# Patient Record
Sex: FEMALE | Race: WHITE | NOT HISPANIC OR LATINO | URBAN - METROPOLITAN AREA
[De-identification: names, ages, dates, MRNs, and addresses within clinical notes are randomized per-mention and may not be internally consistent; named-entity substitution may affect disease eponyms.]

---

## 2017-06-06 ENCOUNTER — ALLSCRIPTS OFFICE VISIT (OUTPATIENT)
Dept: OTHER | Facility: OTHER | Age: 37
End: 2017-06-06

## 2017-06-06 PROCEDURE — 87591 N.GONORRHOEAE DNA AMP PROB: CPT | Performed by: OBSTETRICS & GYNECOLOGY

## 2017-06-06 PROCEDURE — G0145 SCR C/V CYTO,THINLAYER,RESCR: HCPCS | Performed by: OBSTETRICS & GYNECOLOGY

## 2017-06-06 PROCEDURE — 87624 HPV HI-RISK TYP POOLED RSLT: CPT | Performed by: OBSTETRICS & GYNECOLOGY

## 2017-06-06 PROCEDURE — 87491 CHLMYD TRACH DNA AMP PROBE: CPT | Performed by: OBSTETRICS & GYNECOLOGY

## 2017-06-07 ENCOUNTER — LAB REQUISITION (OUTPATIENT)
Dept: LAB | Facility: HOSPITAL | Age: 37
End: 2017-06-07
Payer: COMMERCIAL

## 2017-06-07 DIAGNOSIS — Z12.4 ENCOUNTER FOR SCREENING FOR MALIGNANT NEOPLASM OF CERVIX: ICD-10-CM

## 2017-06-07 DIAGNOSIS — Z11.51 ENCOUNTER FOR SCREENING FOR HUMAN PAPILLOMAVIRUS (HPV): ICD-10-CM

## 2017-06-07 DIAGNOSIS — Z11.3 ENCOUNTER FOR SCREENING FOR INFECTIONS WITH PREDOMINANTLY SEXUAL MODE OF TRANSMISSION: ICD-10-CM

## 2017-06-16 LAB
CHLAMYDIA DNA CVX QL NAA+PROBE: NORMAL
HPV RRNA GENITAL QL NAA+PROBE: ABNORMAL
N GONORRHOEA DNA GENITAL QL NAA+PROBE: NORMAL

## 2017-06-20 ENCOUNTER — GENERIC CONVERSION - ENCOUNTER (OUTPATIENT)
Dept: OTHER | Facility: OTHER | Age: 37
End: 2017-06-20

## 2017-06-20 LAB
LAB AP GYN PRIMARY INTERPRETATION: NORMAL
LAB AP LMP: NORMAL
Lab: NORMAL
PATH INTERP SPEC-IMP: NORMAL

## 2017-06-22 ENCOUNTER — GENERIC CONVERSION - ENCOUNTER (OUTPATIENT)
Dept: OTHER | Facility: OTHER | Age: 37
End: 2017-06-22

## 2017-11-02 ENCOUNTER — ALLSCRIPTS OFFICE VISIT (OUTPATIENT)
Dept: OTHER | Facility: OTHER | Age: 37
End: 2017-11-02

## 2017-11-02 PROCEDURE — 88305 TISSUE EXAM BY PATHOLOGIST: CPT | Performed by: OBSTETRICS & GYNECOLOGY

## 2017-11-03 NOTE — PROCEDURES
Assessment  1  ASCUS with positive high risk HPV cervical (795 01,795 05) (R87 610,R87 810)    Discussion/Summary  Discussion Summary:   ASCUS, + HPV - reviewed this in detail with potient, colposcopy done, ill await results, she will be away for 2 weeks, will call results to her cell  GYN Discussion and Summary:          Abnormal Pap-- Impression: abnormal cervical cytology and high risk HPV found  Currently, findings are new  Active Problems  1  Breast self examination education, encounter for (V65 49) (Z71 89)   2  Cervical cancer screening (V76 2) (Z12 4)   3  Contraception (V25 9) (Z30 9)   4  Encounter for preconception consultation (V26 49) (Z31 69)   5  Denied: History of self breast exam   6  Screening for HPV (human papillomavirus) (V73 81) (Z11 51)   7  Screening for STDs (sexually transmitted diseases) (V74 5) (Z11 3)   8  Visit for routine gyn exam (V72 31) (Z01 419)    Current Meds  1  Sprintec 28 0 25-35 MG-MCG Oral Tablet; Take 1 tablet daily; Therapy: 14ZMM3890 to (Evaluate:91Hwk7623)  Requested for: 64Bil0417; Last   Rx:06Vtz0452 Ordered   2  Sprintec 28 0 25-35 MG-MCG Oral Tablet; TAKE AS DIRECTED; Therapy: 43QEG5942 to (Last Rx:06Jun2017)  Requested for: 06Jun2017 Ordered  3  Ambien TABS; Therapy: (Recorded:25Tyz1060) to Recorded   4  Calcium + D TABS; Therapy: (Recorded:62Opy8100) to Recorded   5  Folic Acid 0 8 MG Oral Capsule; Therapy: 20XDC9301 to Recorded   6  Multiple Vitamins/Womens Oral Tablet; Therapy: (Recorded:61Bai8590) to Recorded   7  Ritalin 10 MG Oral Tablet; Therapy: (Recorded:75Wuv9698) to Recorded    Allergies  1  No Known Drug Allergies    Procedure    Procedure: colposcopy  Indication: atypical squamous cells of undetermined significance-- and-- PCR positive for high risk HPV  Were discussed with the patient  Procedure Note:   A cervical Pap smear was not performed  The squamocolumnar junction was fully visualized   Observation without staining showed no abnormalities  After bathing the cervix in acetic acid, evaluation showed acetowhite changes at 1,3 o'clock  Vaginal Vault: no abnormalities seen  Vulva: no abnormalities seen  Cervical Biopsy: 2 biopsies taken of the cervix  -- the biopsies were taken at 1,3 o'clock  Endocervical curettage was performed  Hemostasis was obtained with Monsel's solution  Patient Status: the patient tolerated the procedure well  Complications: there were no complications  Signatures   Electronically signed by :  Sonja Harden DO; Nov 2 2017  2:24PM EST                       (Author)

## 2017-11-07 ENCOUNTER — LAB REQUISITION (OUTPATIENT)
Dept: LAB | Facility: HOSPITAL | Age: 37
End: 2017-11-07
Payer: COMMERCIAL

## 2017-11-07 DIAGNOSIS — R87.610 ATYPICAL SQUAMOUS CELLS OF UNDETERMINED SIGNIFICANCE ON CYTOLOGIC SMEAR OF CERVIX (ASC-US): ICD-10-CM

## 2017-11-14 ENCOUNTER — GENERIC CONVERSION - ENCOUNTER (OUTPATIENT)
Dept: OTHER | Facility: OTHER | Age: 37
End: 2017-11-14

## 2018-01-12 NOTE — MISCELLANEOUS
Message   Recorded as Task   Date: 06/22/2017 08:21 AM, Created By: Mar Castaneda   Task Name: Go to Result   Assigned To: Cameron Ruff   Regarding Patient: Eugene Alonso, Status: In Progress   Comment:    Mar Castaneda - 22 Jun 2017 8:21 AM     TASK CREATED  ASCUS, + HPV, needs colposcopy   [facility] - 22 Jun 2017 8:30 AM     TASK REASSIGNED: Previously Assigned To Maximino Bolaños - 63 Jun 2017 10:53 AM     TASK IN PROGRESS   Edie Bird - 22 Jun 2017 2:56 PM     TASK EDITED  pt scheduled appt           Active Problems    1  Breast self examination education, encounter for (V65 49) (Z71 89)   2  Cervical cancer screening (V76 2) (Z12 4)   3  Contraception (V25 9) (Z30 9)   4  Encounter for preconception consultation (V26 49) (Z31 69)   5  Denied: History of self breast exam   6  Screening for HPV (human papillomavirus) (V73 81) (Z11 51)   7  Screening for STDs (sexually transmitted diseases) (V74 5) (Z11 3)   8  Visit for routine gyn exam (V72 31) (Z01 419)    Current Meds   1  Ambien TABS (Zolpidem Tartrate); Therapy: (Recorded:08Ajs2383) to Recorded   2  Calcium + D TABS; Therapy: (Recorded:18Jox3168) to Recorded   3  Folic Acid 0 8 MG Oral Capsule; Therapy: 16HZW3917 to Recorded   4  Multiple Vitamins/Womens Oral Tablet; Therapy: (Recorded:22Jwy4008) to Recorded   5  Ritalin 10 MG Oral Tablet (Methylphenidate HCl); Therapy: (Recorded:13Uuw5633) to Recorded   6  Sprintec 28 0 25-35 MG-MCG Oral Tablet; Take 1 tablet daily; Therapy: 02JRW2854 to (Evaluate:77Lwo7708)  Requested for: 64Fun7580; Last   Rx:50Sun9463 Ordered   7  Sprintec 28 0 25-35 MG-MCG Oral Tablet; TAKE AS DIRECTED; Therapy: 19DUH3184 to (Last Rx:06Jun2017)  Requested for: 06Jun2017 Ordered    Allergies    1   No Known Drug Allergies    Signatures   Electronically signed by : Brent Dakins, ; Jun 22 2017  2:56PM EST                       (Author)

## 2018-01-13 VITALS
WEIGHT: 181.25 LBS | HEIGHT: 65 IN | DIASTOLIC BLOOD PRESSURE: 78 MMHG | SYSTOLIC BLOOD PRESSURE: 100 MMHG | BODY MASS INDEX: 30.2 KG/M2

## 2018-01-13 VITALS
DIASTOLIC BLOOD PRESSURE: 70 MMHG | SYSTOLIC BLOOD PRESSURE: 120 MMHG | WEIGHT: 175.5 LBS | BODY MASS INDEX: 29.24 KG/M2 | HEIGHT: 65 IN

## 2018-01-17 NOTE — MISCELLANEOUS
Message   Recorded as Task   Date: 11/13/2017 04:06 PM, Created By: Jennifer Sanchez   Task Name: Go to Result   Assigned To: Romeo Stewart   Regarding Patient: Jean Claude Pulido, Status: In Progress   Comment:    [place] - 13 Nov 2017 4:06 PM     TASK CREATED  colposcopy results shows JOHN 1, will repeat pap and hpv at her yearly next year, please call results to her cell as she is away for work  Edie Bird - 14 Nov 2017 11:57 AM     TASK IN PROGRESS   Delano Rosen - 14 Nov 2017 4:15 PM     TASK EDITED  pt is aware of results  Active Problems    1  ASCUS with positive high risk HPV cervical (795 01,795 05) (R87 610,R87 810)   2  Breast self examination education, encounter for (V65 49) (Z71 89)   3  Cervical cancer screening (V76 2) (Z12 4)   4  Contraception (V25 9) (Z30 9)   5  Encounter for preconception consultation (V26 49) (Z31 69)   6  Denied: History of self breast exam   7  Screening for HPV (human papillomavirus) (V73 81) (Z11 51)   8  Screening for STDs (sexually transmitted diseases) (V74 5) (Z11 3)   9  Visit for routine gyn exam (V72 31) (Z01 419)    Current Meds   1  Ambien TABS (Zolpidem Tartrate); Therapy: (Recorded:71Mxh6008) to Recorded   2  Calcium + D TABS; Therapy: (Recorded:69Qas7826) to Recorded   3  Folic Acid 0 8 MG Oral Capsule; Therapy: 01NXY5780 to Recorded   4  Multiple Vitamins/Womens Oral Tablet; Therapy: (Recorded:43Djz5035) to Recorded   5  Ritalin 10 MG Oral Tablet (Methylphenidate HCl); Therapy: (Recorded:70Afk2238) to Recorded   6  Sprintec 28 0 25-35 MG-MCG Oral Tablet; Take 1 tablet daily; Therapy: 66RFX1050 to (Evaluate:97Nmm3610)  Requested for: 66Okh4099; Last   Rx:82Voj4789 Ordered   7  Sprintec 28 0 25-35 MG-MCG Oral Tablet; TAKE AS DIRECTED; Therapy: 18DCZ1114 to (Last Rx:06Jun2017)  Requested for: 06Jun2017 Ordered    Allergies    1   No Known Drug Allergies    Signatures   Electronically signed by : Anna Marie Delaney, ; Nov 14 2017  4:15PM EST                       (Author)

## 2018-06-22 DIAGNOSIS — Z30.41 ENCOUNTER FOR SURVEILLANCE OF CONTRACEPTIVE PILLS: Primary | ICD-10-CM

## 2018-06-22 RX ORDER — NORGESTIMATE AND ETHINYL ESTRADIOL 0.25-0.035
1 KIT ORAL DAILY
Qty: 84 TABLET | Refills: 0 | Status: SHIPPED | OUTPATIENT
Start: 2018-06-22 | End: 2018-09-12 | Stop reason: SDUPTHER

## 2018-06-24 DIAGNOSIS — Z30.41 ENCOUNTER FOR SURVEILLANCE OF CONTRACEPTIVE PILLS: ICD-10-CM

## 2018-09-12 DIAGNOSIS — Z30.41 ENCOUNTER FOR SURVEILLANCE OF CONTRACEPTIVE PILLS: ICD-10-CM

## 2018-09-13 RX ORDER — NORGESTIMATE AND ETHINYL ESTRADIOL 0.25-0.035
1 KIT ORAL DAILY
Qty: 84 TABLET | Refills: 0 | Status: SHIPPED | OUTPATIENT
Start: 2018-09-13 | End: 2018-12-04 | Stop reason: SDUPTHER

## 2018-12-04 DIAGNOSIS — Z30.41 ENCOUNTER FOR SURVEILLANCE OF CONTRACEPTIVE PILLS: ICD-10-CM

## 2018-12-06 RX ORDER — NORGESTIMATE AND ETHINYL ESTRADIOL 0.25-0.035
1 KIT ORAL DAILY
Qty: 84 TABLET | Refills: 0 | Status: SHIPPED | OUTPATIENT
Start: 2018-12-06 | End: 2018-12-10

## 2018-12-10 ENCOUNTER — ANNUAL EXAM (OUTPATIENT)
Dept: OBGYN CLINIC | Facility: CLINIC | Age: 38
End: 2018-12-10
Payer: COMMERCIAL

## 2018-12-10 VITALS
WEIGHT: 188.6 LBS | HEIGHT: 65 IN | BODY MASS INDEX: 31.42 KG/M2 | DIASTOLIC BLOOD PRESSURE: 78 MMHG | SYSTOLIC BLOOD PRESSURE: 108 MMHG

## 2018-12-10 DIAGNOSIS — Z12.4 SCREENING FOR CERVICAL CANCER: ICD-10-CM

## 2018-12-10 DIAGNOSIS — Z11.51 SCREENING FOR HPV (HUMAN PAPILLOMAVIRUS): Primary | ICD-10-CM

## 2018-12-10 DIAGNOSIS — Z01.419 ENCOUNTER FOR ANNUAL ROUTINE GYNECOLOGICAL EXAMINATION: ICD-10-CM

## 2018-12-10 DIAGNOSIS — N87.0 MILD DYSPLASIA OF CERVIX: ICD-10-CM

## 2018-12-10 PROCEDURE — 87624 HPV HI-RISK TYP POOLED RSLT: CPT | Performed by: OBSTETRICS & GYNECOLOGY

## 2018-12-10 PROCEDURE — G0145 SCR C/V CYTO,THINLAYER,RESCR: HCPCS | Performed by: OBSTETRICS & GYNECOLOGY

## 2018-12-10 PROCEDURE — S0612 ANNUAL GYNECOLOGICAL EXAMINA: HCPCS | Performed by: OBSTETRICS & GYNECOLOGY

## 2018-12-10 NOTE — PROGRESS NOTES
Assessment/Plan:    Contraception-we discussed various options for her including switching to a different pill, Depo-Provera which we decided was not a good option for her because of weight gain and mood changes  She was also on it before and did not like it  We discussed Nexplanon and both types of IUD She was given information on these options to review  For now, she is going to continue [facility] and she will call if she wants to make a change  Mild dysplasia-Pap and HPV done today    We reviewed regular exercise  No problem-specific Assessment & Plan notes found for this encounter  Diagnoses and all orders for this visit:    Screening for HPV (human papillomavirus)  -     Liquid-based pap, screening    Encounter for annual routine gynecological examination    Screening for cervical cancer  -     Liquid-based pap, screening          Subjective:      Patient ID: Jenelle Loredo is a 45 y o  female  Patient here for yearly  Last year, she had ASCUS with positive HPV and colposcopy revealed JOHN 1 on her cervical biopsies and a normal ECC  This will be her 1st repeat Pap with HPV test   She has been on Sprintec for many years and is questioning if she should continue  She has decided not to pursue pregnancy  This month she has had breakthrough bleeding on Sprintec  This is the 1st time she ever had breakthrough bleeding  She had some questions about an IUD or NuvaRing  No other gyn complaints  The following portions of the patient's history were reviewed and updated as appropriate: allergies, current medications, past family history, past medical history, past social history, past surgical history and problem list     Review of Systems   Constitutional: Negative  HENT: Negative  Eyes: Negative  Respiratory: Negative  Cardiovascular: Negative  Gastrointestinal: Negative  Endocrine: Negative  Genitourinary: Negative  Musculoskeletal: Negative  Skin: Negative  Allergic/Immunologic: Negative  Neurological: Negative  Hematological: Negative  Psychiatric/Behavioral: Negative  Objective:      /78 (BP Location: Right arm, Patient Position: Sitting, Cuff Size: Standard)   Ht 5' 4 75" (1 645 m)   Wt 85 5 kg (188 lb 9 6 oz)   LMP 12/03/2018 (Exact Date)   Breastfeeding? No   BMI 31 63 kg/m²          Physical Exam   Constitutional: She appears well-developed  Neck: No tracheal deviation present  No thyromegaly present  Cardiovascular: Normal rate and regular rhythm  Pulmonary/Chest: Effort normal and breath sounds normal  Right breast exhibits no inverted nipple, no mass, no nipple discharge, no skin change and no tenderness  Left breast exhibits no inverted nipple, no mass, no nipple discharge, no skin change and no tenderness  Breasts are symmetrical    Examined seated and supine   Abdominal: Soft  She exhibits no distension and no mass  There is no tenderness  Genitourinary: Rectum normal, vagina normal and uterus normal  No labial fusion  There is no rash, tenderness, lesion or injury on the right labia  There is no rash, tenderness, lesion or injury on the left labia  Cervix exhibits no motion tenderness, no discharge and no friability  Right adnexum displays no mass, no tenderness and no fullness  Left adnexum displays no mass, no tenderness and no fullness  Vitals reviewed

## 2018-12-11 LAB
HPV HR 12 DNA CVX QL NAA+PROBE: NEGATIVE
HPV16 DNA CVX QL NAA+PROBE: NEGATIVE
HPV18 DNA CVX QL NAA+PROBE: NEGATIVE

## 2018-12-13 LAB
LAB AP GYN PRIMARY INTERPRETATION: NORMAL
LAB AP LMP: NORMAL
Lab: NORMAL

## 2018-12-14 ENCOUNTER — TELEPHONE (OUTPATIENT)
Dept: OBGYN CLINIC | Facility: CLINIC | Age: 38
End: 2018-12-14

## 2020-06-30 DIAGNOSIS — Z30.41 ENCOUNTER FOR SURVEILLANCE OF CONTRACEPTIVE PILLS: Primary | ICD-10-CM

## 2020-06-30 RX ORDER — NORGESTIMATE AND ETHINYL ESTRADIOL 0.25-0.035
1 KIT ORAL DAILY
Qty: 84 TABLET | Refills: 0 | Status: SHIPPED | OUTPATIENT
Start: 2020-06-30 | End: 2020-08-18

## 2020-08-16 DIAGNOSIS — Z30.41 ENCOUNTER FOR SURVEILLANCE OF CONTRACEPTIVE PILLS: ICD-10-CM

## 2020-08-18 RX ORDER — NORGESTIMATE AND ETHINYL ESTRADIOL 0.25-0.035
KIT ORAL
Qty: 84 TABLET | Refills: 0 | Status: SHIPPED | OUTPATIENT
Start: 2020-08-18 | End: 2020-09-01 | Stop reason: SDUPTHER

## 2020-09-01 ENCOUNTER — ANNUAL EXAM (OUTPATIENT)
Dept: OBGYN CLINIC | Facility: CLINIC | Age: 40
End: 2020-09-01
Payer: COMMERCIAL

## 2020-09-01 VITALS
DIASTOLIC BLOOD PRESSURE: 72 MMHG | SYSTOLIC BLOOD PRESSURE: 110 MMHG | TEMPERATURE: 97.8 F | BODY MASS INDEX: 29.68 KG/M2 | WEIGHT: 177 LBS

## 2020-09-01 DIAGNOSIS — Z30.41 ENCOUNTER FOR SURVEILLANCE OF CONTRACEPTIVE PILLS: ICD-10-CM

## 2020-09-01 DIAGNOSIS — Z01.419 ENCOUNTER FOR ANNUAL ROUTINE GYNECOLOGICAL EXAMINATION: Primary | ICD-10-CM

## 2020-09-01 DIAGNOSIS — Z12.31 ENCOUNTER FOR SCREENING MAMMOGRAM FOR BREAST CANCER: ICD-10-CM

## 2020-09-01 DIAGNOSIS — N87.0 CIN I (CERVICAL INTRAEPITHELIAL NEOPLASIA I): ICD-10-CM

## 2020-09-01 PROCEDURE — G0145 SCR C/V CYTO,THINLAYER,RESCR: HCPCS | Performed by: OBSTETRICS & GYNECOLOGY

## 2020-09-01 PROCEDURE — 87624 HPV HI-RISK TYP POOLED RSLT: CPT | Performed by: OBSTETRICS & GYNECOLOGY

## 2020-09-01 PROCEDURE — S0612 ANNUAL GYNECOLOGICAL EXAMINA: HCPCS | Performed by: OBSTETRICS & GYNECOLOGY

## 2020-09-01 RX ORDER — NORGESTIMATE AND ETHINYL ESTRADIOL 0.25-0.035
1 KIT ORAL DAILY
Qty: 84 TABLET | Refills: 4 | Status: SHIPPED | OUTPATIENT
Start: 2020-09-01 | End: 2021-09-10 | Stop reason: ALTCHOICE

## 2020-09-01 NOTE — PROGRESS NOTES
Assessment/Plan:    pap and HPV done today    Contraception - RX Sprintec sent    Baseline mammogram ordered     Discussed self breast exams    discussed preventive care, regular exercise and a healthy diet, discussed dermatology for a routine skin examination      No problem-specific Assessment & Plan notes found for this encounter  Diagnoses and all orders for this visit:    Encounter for annual routine gynecological examination  -     Liquid-based pap, screening    Encounter for screening mammogram for breast cancer  -     Mammo screening bilateral w 3d & cad; Future    JOHN I (cervical intraepithelial neoplasia I)  -     Liquid-based pap, screening    Encounter for surveillance of contraceptive pills  -     norgestimate-ethinyl estradiol (Mono-Linyah) 0 25-35 MG-MCG per tablet; Take 1 tablet by mouth daily          Subjective:      Patient ID: Sweta Shields is a 36 y o  female  Pt here for yearly  She has no complaints  She is happy with Sprintec with no side effects  BP is good  She is due for a baseline mammogram       2017 ASCUS + HPV  Colposcopy 2017 JOHN I on two bx, ECC negative  2018 normal pap and negative HPV      The following portions of the patient's history were reviewed and updated as appropriate: allergies, current medications, past family history, past medical history, past social history, past surgical history and problem list     Review of Systems   Constitutional: Negative  Gastrointestinal: Negative  Genitourinary: Negative  Objective: There were no vitals taken for this visit  Physical Exam  Vitals signs reviewed  Constitutional:       Appearance: She is well-developed  Neck:      Thyroid: No thyromegaly  Trachea: No tracheal deviation  Cardiovascular:      Rate and Rhythm: Normal rate and regular rhythm  Pulmonary:      Effort: Pulmonary effort is normal       Breath sounds: Normal breath sounds     Chest:      Breasts: Breasts are symmetrical          Right: No inverted nipple, mass, nipple discharge, skin change or tenderness  Left: No inverted nipple, mass, nipple discharge, skin change or tenderness  Abdominal:      General: There is no distension  Palpations: Abdomen is soft  There is no mass  Tenderness: There is no abdominal tenderness  Genitourinary:     Labia:         Right: No rash, tenderness, lesion or injury  Left: No rash, tenderness, lesion or injury  Vagina: Normal       Cervix: No cervical motion tenderness, discharge or friability  Adnexa:         Right: No mass, tenderness or fullness  Left: No mass, tenderness or fullness          Rectum: Normal

## 2020-09-09 LAB
LAB AP GYN PRIMARY INTERPRETATION: NORMAL
LAB AP LMP: NORMAL
Lab: NORMAL

## 2021-07-06 DIAGNOSIS — Z30.41 ENCOUNTER FOR SURVEILLANCE OF CONTRACEPTIVE PILLS: Primary | ICD-10-CM

## 2021-07-06 RX ORDER — NORGESTIMATE AND ETHINYL ESTRADIOL 0.25-0.035
1 KIT ORAL DAILY
Qty: 84 TABLET | Refills: 0 | Status: SHIPPED | OUTPATIENT
Start: 2021-07-06 | End: 2021-09-09 | Stop reason: SDUPTHER

## 2021-09-09 ENCOUNTER — TELEPHONE (OUTPATIENT)
Dept: OBGYN CLINIC | Facility: CLINIC | Age: 41
End: 2021-09-09

## 2021-09-09 ENCOUNTER — ANNUAL EXAM (OUTPATIENT)
Dept: OBGYN CLINIC | Facility: CLINIC | Age: 41
End: 2021-09-09
Payer: MEDICARE

## 2021-09-09 VITALS
SYSTOLIC BLOOD PRESSURE: 110 MMHG | HEIGHT: 65 IN | DIASTOLIC BLOOD PRESSURE: 60 MMHG | BODY MASS INDEX: 26.29 KG/M2 | WEIGHT: 157.8 LBS

## 2021-09-09 DIAGNOSIS — Z01.419 ENCOUNTER FOR ANNUAL ROUTINE GYNECOLOGICAL EXAMINATION: Primary | ICD-10-CM

## 2021-09-09 DIAGNOSIS — Z12.31 ENCOUNTER FOR SCREENING MAMMOGRAM FOR BREAST CANCER: ICD-10-CM

## 2021-09-09 DIAGNOSIS — Z30.41 ENCOUNTER FOR SURVEILLANCE OF CONTRACEPTIVE PILLS: ICD-10-CM

## 2021-09-09 PROCEDURE — 99396 PREV VISIT EST AGE 40-64: CPT | Performed by: OBSTETRICS & GYNECOLOGY

## 2021-09-09 RX ORDER — NORGESTIMATE AND ETHINYL ESTRADIOL 0.25-0.035
1 KIT ORAL DAILY
Qty: 84 TABLET | Refills: 4 | Status: SHIPPED | OUTPATIENT
Start: 2021-09-09 | End: 2021-09-10 | Stop reason: ALTCHOICE

## 2021-09-09 NOTE — TELEPHONE ENCOUNTER
----- Message from Herve Prather DO sent at 9/9/2021 11:03 AM EDT -----    Hello,     Please call this patient as soon as she can  She was in for a yearly this morning and had no issues  When I was finishing her note, I noticed that she stated that she was smoking and this started in April  She cannot be on Sprintec if she is smoking and we will need to discontinue her pills she confirms that she is smoking cigarettes  Please let me know when you talk to her    Thank you    Aiden Betancourt

## 2021-09-09 NOTE — PROGRESS NOTES
Assessment/Plan:    ADDENDUM -     After the patient left, it was noted that she stated during check in that she started smoking in April  I called her to attempt to confirm this as she has not been a smoker in the past and she should not be on combination oral contraceptives if she is smoking  She did not answer her phone  Nursing left a message for her to call us  If she is still smoking, she must switch to a progesterone only pill  pap is up to date    Contraception - RX sprintec renewed  She will schedule a baseline mammogram  Discussed self breast exams    Discussed importance of muscle strengthening exercises, especially since she has lost weight    discussed preventive care, regular exercise and a healthy diet      No problem-specific Assessment & Plan notes found for this encounter  Diagnoses and all orders for this visit:    Encounter for annual routine gynecological examination    Encounter for screening mammogram for breast cancer    Encounter for surveillance of contraceptive pills  -     norgestimate-ethinyl estradiol (ORTHO-CYCLEN) 0 25-35 MG-MCG per tablet; Take 1 tablet by mouth daily          Subjective:      Patient ID: Stefan Fulton is a 39 y o  female  Patient here for yearly  She is on generic Ortho Cyclen for contraception  She has not yet had her baseline mammogram  She has lost about 20# since last year  She started a new job and is extremely active  She feels well but feels that she has lost some muscle since her weight loss  She had JOHN 1 in 2017  Normal Pap and negative HPV in 2018 and 2020  The following portions of the patient's history were reviewed and updated as appropriate: allergies, current medications, past family history, past medical history, past social history, past surgical history and problem list     Review of Systems   Constitutional: Negative  Gastrointestinal: Negative  Genitourinary: Negative  Objective:       There were no vitals taken for this visit  Physical Exam  Vitals reviewed  Constitutional:       Appearance: She is well-developed  Neck:      Thyroid: No thyromegaly  Trachea: No tracheal deviation  Cardiovascular:      Rate and Rhythm: Normal rate and regular rhythm  Pulmonary:      Effort: Pulmonary effort is normal       Breath sounds: Normal breath sounds  Chest:      Breasts: Breasts are symmetrical          Right: No inverted nipple, mass, nipple discharge, skin change or tenderness  Left: No inverted nipple, mass, nipple discharge, skin change or tenderness  Abdominal:      General: There is no distension  Palpations: Abdomen is soft  There is no mass  Tenderness: There is no abdominal tenderness  Genitourinary:     Labia:         Right: No rash, tenderness, lesion or injury  Left: No rash, tenderness, lesion or injury  Vagina: Normal       Cervix: No cervical motion tenderness, discharge or friability  Adnexa:         Right: No mass, tenderness or fullness  Left: No mass, tenderness or fullness          Rectum: Normal

## 2021-09-10 ENCOUNTER — DOCUMENTATION (OUTPATIENT)
Dept: OBGYN CLINIC | Facility: CLINIC | Age: 41
End: 2021-09-10

## 2021-09-10 NOTE — PROGRESS NOTES
Called patient and left a detailed voicemail on her cell phone stating that if she is indeed smoking, she should continue her pill and we will have to switch her to a progesterone only pill due to increased risk on a combination pill  I asked her to call E office as soon as possible so we can clarify this

## 2021-09-10 NOTE — PROGRESS NOTES
Called the pharmacy and cancelled the RX  When we speak to patient, we can send new RX or renew this one if appropriate  The pharmacist cancelled it  She patient did refill It last week  We will attempt to call her again on Monday

## 2021-09-14 ENCOUNTER — TELEPHONE (OUTPATIENT)
Dept: OBGYN CLINIC | Facility: CLINIC | Age: 41
End: 2021-09-14

## 2021-09-14 NOTE — TELEPHONE ENCOUNTER
----- Message from Kristopher Paul DO sent at 9/13/2021 12:31 PM EDT -----  Hello,    I sent a previous message for this but can you check on this for me? She was in for her yearly on Thursday  When she left I realized she said in her history that she started smoking in April  I tried to call her right after she left but keep getting her voicemail  I did leave her a detailed message  If she is smoking, she needs progesterone only pills  I did not send them because I wanted to check with her first   I did cancel her RX for her sprintec though  Please keep me updated  Thanks  Gris  ----- Message -----  From: Kristopher Paul DO  Sent: 9/10/2021   3:51 PM EDT  To: Kristopher Paul, DO    Make sure we get in touch with patient and change her pills if necessary

## 2021-09-14 NOTE — TELEPHONE ENCOUNTER
----- Message from Kezia Benson DO sent at 9/13/2021  7:43 PM EDT -----  Rissa Lam, we will have to stay on top of this  Thank you  ----- Message -----  From: Rachel Majano  Sent: 9/13/2021   4:41 PM EDT  To: Kezia Benson DO    I tried to call this patient 2 times and left her 2 messages  Raquel Page  ----- Message -----  From: Kezia Benson DO  Sent: 9/13/2021  12:31 PM EDT  To: Neo Palacios, #    Hello,    I sent a previous message for this but can you check on this for me? She was in for her yearly on Thursday  When she left I realized she said in her history that she started smoking in April  I tried to call her right after she left but keep getting her voicemail  I did leave her a detailed message  If she is smoking, she needs progesterone only pills  I did not send them because I wanted to check with her first   I did cancel her RX for her sprintec though  Please keep me updated  Thanks  Gris  ----- Message -----  From: Kezia Benson DO  Sent: 9/10/2021   3:51 PM EDT  To: Kezia Benson DO    Make sure we get in touch with patient and change her pills if necessary

## 2021-09-15 ENCOUNTER — TELEPHONE (OUTPATIENT)
Dept: OBGYN CLINIC | Facility: CLINIC | Age: 41
End: 2021-09-15

## 2021-09-15 DIAGNOSIS — Z30.41 ENCOUNTER FOR SURVEILLANCE OF CONTRACEPTIVE PILLS: Primary | ICD-10-CM

## 2021-09-15 RX ORDER — ACETAMINOPHEN AND CODEINE PHOSPHATE 120; 12 MG/5ML; MG/5ML
1 SOLUTION ORAL DAILY
Qty: 28 TABLET | Refills: 14 | Status: SHIPPED | OUTPATIENT
Start: 2021-09-15

## 2021-09-15 NOTE — TELEPHONE ENCOUNTER
Olena  am glad we got in touch with her  I will send a RX for Micronor and she can start it as soon as she gets it and discontinue her current pill  She may have some bleeding with the switch  Thanks so much

## 2021-09-15 NOTE — PROGRESS NOTES
Pt called back and confirmed that she started smoking  RX for Micronor sent to her pharmacy  She will start this right away and stop the sprintec

## 2021-09-15 NOTE — TELEPHONE ENCOUNTER
Patient called back, left message, verifying that she did start smoking in April  She will need a new BCP Rx sent to [facility]  All instructions given

## 2021-09-15 NOTE — TELEPHONE ENCOUNTER
----- Message from Yoli Rush DO sent at 9/13/2021 12:31 PM EDT -----  Hello,    I sent a previous message for this but can you check on this for me? She was in for her yearly on Thursday  When she left I realized she said in her history that she started smoking in April  I tried to call her right after she left but keep getting her voicemail  I did leave her a detailed message  If she is smoking, she needs progesterone only pills  I did not send them because I wanted to check with her first   I did cancel her RX for her sprintec though  Please keep me updated  Thanks  Gris  ----- Message -----  From: Yoli Rush DO  Sent: 9/10/2021   3:51 PM EDT  To: Yoli Rush DO    Make sure we get in touch with patient and change her pills if necessary

## 2021-09-17 NOTE — TELEPHONE ENCOUNTER
Patient informed  Patient apologizes for being hard to reach  She has been having trouble with her voicemail  She thanks Dr Lauro Avila for her attention and care

## 2022-02-03 ENCOUNTER — TELEPHONE (OUTPATIENT)
Dept: OBGYN CLINIC | Facility: CLINIC | Age: 42
End: 2022-02-03

## 2022-02-03 NOTE — TELEPHONE ENCOUNTER
She is    I feel that she does not need this at 42, but I guess that I will just tell her that    She would have to stop them, correct?

## 2022-02-03 NOTE — TELEPHONE ENCOUNTER
Pt called    she has this gut feeling that something is depleted with her hormones    I asked her if she was having sx and she said no    she just wants her levels checked for peace of mind  Cachorro Balderas she wanted you to know that she quit smoking and she intends to stay that way  Cachorro Balderas ? ?

## 2022-02-04 NOTE — TELEPHONE ENCOUNTER
Yes, generally we do not routinely check hormones  I do sometimes check a TSH and a CBC if patients are feeling fatigued and this could be done well she is on her pills but I could not check her to see if she is akin menopausal or menopausal while she is taking the progesterone

## 2022-02-09 NOTE — TELEPHONE ENCOUNTER
Pt just had the thyroid and labs done by her PCP  Lizbeth Mckenna  she will talk again about it at her annual exam

## 2022-06-23 DIAGNOSIS — Z12.31 ENCOUNTER FOR SCREENING MAMMOGRAM FOR BREAST CANCER: Primary | ICD-10-CM

## 2022-07-25 ENCOUNTER — TELEPHONE (OUTPATIENT)
Dept: OBGYN CLINIC | Facility: CLINIC | Age: 42
End: 2022-07-25

## 2022-09-14 NOTE — PROGRESS NOTES
Assessment/Plan:    Pap and HPV done today    Chlamydia and gonorrhea done    mammogram reviewed with her including breast density  RX given for next year     Discussed self breast exams     contraception- she will continue with the progesterone only pills, prescription sent   She will most likely stay on this pill, we discussed the risks and benefits of a progesterone only pill  If she does want to go back on a combination pill, she must be not smoking for at least 3 months with good blood pressure  discussed preventive care, regular exercise and a healthy diet   She gained weight since last year and is going to work on losing this again  No problem-specific Assessment & Plan notes found for this encounter  Diagnoses and all orders for this visit:    Encounter for annual routine gynecological examination    Encounter for screening mammogram for breast cancer  -     Mammo screening bilateral w 3d & cad; Future    Encounter for surveillance of contraceptive pills  -     norethindrone (MICRONOR) 0 35 MG tablet; Take 1 tablet (0 35 mg total) by mouth daily          Subjective:      Patient ID: Radha Massey is a 43 y o  female  Patient here for yearly  She is on progesterone only pills for contraception because she started smoking over a year ago  She just stops smoking 2 and half weeks ago  She is happy with the progesterone only pills  She is getting a cycle every month  Normal Pap, negative HPV in 2020 , she had JOHN 1 in 2017 with normal Pap and negative HPV in 2018 and 2020  She does have a new partner since January  Normal 3D mammogram last month with scattered fibroglandular densities  The following portions of the patient's history were reviewed and updated as appropriate: allergies, current medications, past family history, past medical history, past social history, past surgical history and problem list     Review of Systems   Constitutional: Negative      Gastrointestinal: Negative  Genitourinary: Negative  Objective: There were no vitals taken for this visit  Physical Exam  Vitals reviewed  Constitutional:       Appearance: She is well-developed  Neck:      Thyroid: No thyromegaly  Trachea: No tracheal deviation  Cardiovascular:      Rate and Rhythm: Normal rate and regular rhythm  Pulmonary:      Effort: Pulmonary effort is normal       Breath sounds: Normal breath sounds  Chest:   Breasts: Breasts are symmetrical       Right: No inverted nipple, mass, nipple discharge, skin change or tenderness  Left: No inverted nipple, mass, nipple discharge, skin change or tenderness  Abdominal:      General: There is no distension  Palpations: Abdomen is soft  There is no mass  Tenderness: There is no abdominal tenderness  Genitourinary:     Labia:         Right: No rash, tenderness, lesion or injury  Left: No rash, tenderness, lesion or injury  Vagina: Normal       Cervix: No cervical motion tenderness, discharge or friability  Adnexa:         Right: No mass, tenderness or fullness  Left: No mass, tenderness or fullness          Rectum: Normal

## 2022-09-15 ENCOUNTER — ANNUAL EXAM (OUTPATIENT)
Dept: GYNECOLOGY | Facility: CLINIC | Age: 42
End: 2022-09-15
Payer: COMMERCIAL

## 2022-09-15 VITALS
DIASTOLIC BLOOD PRESSURE: 70 MMHG | BODY MASS INDEX: 27.93 KG/M2 | SYSTOLIC BLOOD PRESSURE: 118 MMHG | WEIGHT: 173.8 LBS | HEIGHT: 66 IN

## 2022-09-15 DIAGNOSIS — Z30.41 ENCOUNTER FOR SURVEILLANCE OF CONTRACEPTIVE PILLS: ICD-10-CM

## 2022-09-15 DIAGNOSIS — Z12.4 CERVICAL CANCER SCREENING: Primary | ICD-10-CM

## 2022-09-15 DIAGNOSIS — Z11.51 SCREENING FOR HPV (HUMAN PAPILLOMAVIRUS): ICD-10-CM

## 2022-09-15 DIAGNOSIS — Z01.419 ENCOUNTER FOR ANNUAL ROUTINE GYNECOLOGICAL EXAMINATION: ICD-10-CM

## 2022-09-15 DIAGNOSIS — Z12.31 ENCOUNTER FOR SCREENING MAMMOGRAM FOR BREAST CANCER: ICD-10-CM

## 2022-09-15 PROCEDURE — 99396 PREV VISIT EST AGE 40-64: CPT | Performed by: OBSTETRICS & GYNECOLOGY

## 2022-09-15 PROCEDURE — 0503F POSTPARTUM CARE VISIT: CPT | Performed by: OBSTETRICS & GYNECOLOGY

## 2022-09-15 RX ORDER — ACETAMINOPHEN AND CODEINE PHOSPHATE 120; 12 MG/5ML; MG/5ML
1 SOLUTION ORAL DAILY
Qty: 28 TABLET | Refills: 14 | Status: SHIPPED | OUTPATIENT
Start: 2022-09-15

## 2022-09-21 LAB
C TRACH RRNA CVX QL NAA+PROBE: NEGATIVE
CYTOLOGIST CVX/VAG CYTO: NORMAL
DX ICD CODE: NORMAL
HPV I/H RISK 4 DNA CVX QL PROBE+SIG AMP: NEGATIVE
N GONORRHOEA RRNA CVX QL NAA+PROBE: NEGATIVE
OTHER STN SPEC: NORMAL
PATH REPORT.FINAL DX SPEC: NORMAL
SL AMB NOTE:: NORMAL
SL AMB SPECIMEN ADEQUACY: NORMAL
SL AMB TEST METHODOLOGY: NORMAL

## 2023-09-20 NOTE — PROGRESS NOTES
Assessment/Plan:    pap is up to date, will do next year    3D mammogram ordered. I explained to her that the recommendation is to have 1 yearly. Discussed self breast exams    Recommend that she keep track of her cycles. Menopause questions answered. discussed preventive care, regular exercise and a healthy diet      No problem-specific Assessment & Plan notes found for this encounter. Diagnoses and all orders for this visit:    Encounter for annual routine gynecological examination    Encounter for screening mammogram for breast cancer  -     Mammo screening bilateral w 3d & cad; Future          Subjective:      Patient ID: Domitila Bonner is a 37 y.o. female. Patient here for yearly. She was switched to progesterone only pills because she was smoking. She stopped them about 1 1/2 years ago. Menses are regular, once a month. Bleeds for 4 days, flow is moderate. Sometimes cramps are painful. Partner had a vasectomy    Normal Pap, negative HPV in September 2020, declines STD testing but partner was just tested. Normal 3D mammogram last August with scattered fibroglandular densities  Non smoker    She has some general questions regarding menopause. She has no symptoms      The following portions of the patient's history were reviewed and updated as appropriate: allergies, current medications, past family history, past medical history, past social history, past surgical history and problem list.    Review of Systems   Constitutional: Negative. Gastrointestinal: Negative. Genitourinary: Negative. Objective: There were no vitals taken for this visit. Physical Exam  Vitals reviewed. Constitutional:       Appearance: She is well-developed. Neck:      Thyroid: No thyromegaly. Trachea: No tracheal deviation. Cardiovascular:      Rate and Rhythm: Normal rate and regular rhythm.    Pulmonary:      Effort: Pulmonary effort is normal.      Breath sounds: Normal breath sounds. Chest:   Breasts:     Breasts are symmetrical.      Right: No inverted nipple, mass, nipple discharge, skin change or tenderness. Left: No inverted nipple, mass, nipple discharge, skin change or tenderness. Abdominal:      General: There is no distension. Palpations: Abdomen is soft. There is no mass. Tenderness: There is no abdominal tenderness. Genitourinary:     Labia:         Right: No rash, tenderness, lesion or injury. Left: No rash, tenderness, lesion or injury. Vagina: Normal.      Cervix: No cervical motion tenderness, discharge or friability. Adnexa:         Right: No mass, tenderness or fullness. Left: No mass, tenderness or fullness.         Rectum: Normal.

## 2023-09-21 ENCOUNTER — ANNUAL EXAM (OUTPATIENT)
Dept: GYNECOLOGY | Facility: CLINIC | Age: 43
End: 2023-09-21
Payer: COMMERCIAL

## 2023-09-21 VITALS
WEIGHT: 157.8 LBS | DIASTOLIC BLOOD PRESSURE: 76 MMHG | HEIGHT: 65 IN | SYSTOLIC BLOOD PRESSURE: 128 MMHG | BODY MASS INDEX: 26.29 KG/M2

## 2023-09-21 DIAGNOSIS — Z01.419 ENCOUNTER FOR ANNUAL ROUTINE GYNECOLOGICAL EXAMINATION: Primary | ICD-10-CM

## 2023-09-21 DIAGNOSIS — Z12.31 ENCOUNTER FOR SCREENING MAMMOGRAM FOR BREAST CANCER: ICD-10-CM

## 2023-09-21 PROCEDURE — 99396 PREV VISIT EST AGE 40-64: CPT | Performed by: OBSTETRICS & GYNECOLOGY

## 2024-06-16 NOTE — TELEPHONE ENCOUNTER
Patient called for mammogram order to be sent to [facility] in [place]  Has 8/1/22 mammogram appointment    Faxed order per request  Xray Elbow AP + Lateral + Oblique, Right

## 2024-09-26 ENCOUNTER — ANNUAL EXAM (OUTPATIENT)
Dept: GYNECOLOGY | Facility: CLINIC | Age: 44
End: 2024-09-26
Payer: COMMERCIAL

## 2024-09-26 VITALS — DIASTOLIC BLOOD PRESSURE: 66 MMHG | BODY MASS INDEX: 26.46 KG/M2 | SYSTOLIC BLOOD PRESSURE: 110 MMHG | WEIGHT: 159 LBS

## 2024-09-26 DIAGNOSIS — Z01.419 ENCOUNTER FOR ANNUAL ROUTINE GYNECOLOGICAL EXAMINATION: Primary | ICD-10-CM

## 2024-09-26 DIAGNOSIS — Z30.09 GENERAL COUNSELING AND ADVICE ON FEMALE CONTRACEPTION: ICD-10-CM

## 2024-09-26 DIAGNOSIS — Z12.31 ENCOUNTER FOR SCREENING MAMMOGRAM FOR BREAST CANCER: ICD-10-CM

## 2024-09-26 DIAGNOSIS — Z11.3 SCREENING FOR STD (SEXUALLY TRANSMITTED DISEASE): ICD-10-CM

## 2024-09-26 PROCEDURE — 99459 PELVIC EXAMINATION: CPT | Performed by: OBSTETRICS & GYNECOLOGY

## 2024-09-26 PROCEDURE — 99396 PREV VISIT EST AGE 40-64: CPT | Performed by: OBSTETRICS & GYNECOLOGY

## 2024-09-26 RX ORDER — ACETAMINOPHEN AND CODEINE PHOSPHATE 120; 12 MG/5ML; MG/5ML
1 SOLUTION ORAL DAILY
Qty: 84 TABLET | Refills: 4 | Status: SHIPPED | OUTPATIENT
Start: 2024-09-26

## 2024-09-26 NOTE — PROGRESS NOTES
Ambulatory Visit  Name: Clemencia Armstrong      : 1980      MRN: [id number]  Encounter Provider: Gris Martínez DO  Encounter Date: 2024   Encounter department: [facility]    Assessment & Plan  Encounter for annual routine gynecological examination         Encounter for screening mammogram for breast cancer         Screening for STD (sexually transmitted disease)    Orders:    HSV 1/2 IgM and Type Specific IgG; Future    pap is up to date    mammogram reviewed with her including breast density.  She is aware that she is overdue and she is planning to schedule this.  Discussed self breast exams    colon cancer screening-we discussed baseline colonoscopy next year as she will turn 45.  She was given the card for the GI group    Contraception-she is not currently sexually active but would like to have a prescription for the progesterone only pills as she may start it in the coming year.  I reviewed starting it on the first day of her menstrual cycle.  She took it in the past with good results and will call if she has any concerns.    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  I reviewed this with her.  She has never had any symptoms related to HSV and was never told that she had it in the past.  She has a remote history of chlamydia.  All of her other testing was normal including HIV, hepatitis, syphilis, chlamydia and gonorrhea.  This was done in July.  She asked for repeat test and I did order this for her.  I explained that it is very unusual to have a false positive in serum.  She is understandably upset over this but I explained that if she has symptoms, she will be treated and I reviewed the prodrome of HSV and an outbreak.  She does not seem to have ever experienced this.  I recommended she call if she has further questions.    discussed preventive care, regular exercise and a  healthy diet    History of Present Illness     Clemencia Armstrong is a 44 y.o. female who presents for yearly.  Menses are regular, she is not sexually active.  She has 2 weeks of premenstrual bloating  She may be interested in restarting progesterone only pills at some point in the near future.    Normal pap, negative HPV in 2022, h/o JOHN I in 2017, paps have been normal since then  Normal 3D mammogram in 2022 with scattered fibroglandular densities        Review of Systems   Constitutional: Negative.    Gastrointestinal: Negative.    Genitourinary: Negative.            Objective     There were no vitals taken for this visit.    Physical Exam  Vitals and nursing note reviewed. Exam conducted with a chaperone present.   Constitutional:       Appearance: She is well-developed.   HENT:      Head: Normocephalic and atraumatic.   Neck:      Thyroid: No thyromegaly.   Cardiovascular:      Rate and Rhythm: Normal rate and regular rhythm.   Pulmonary:      Effort: Pulmonary effort is normal.      Breath sounds: Normal breath sounds.   Chest:   Breasts:     Right: Normal.      Left: Normal.      Comments: Examined seated and supine  Abdominal:      Palpations: Abdomen is soft.   Genitourinary:     General: Normal vulva.      Vagina: Normal.      Cervix: Normal.      Uterus: Normal.       Adnexa: Right adnexa normal and left adnexa normal.      Rectum: Normal.   Musculoskeletal:      Cervical back: Neck supple.   Skin:     General: Skin is warm and dry.   Neurological:      Mental Status: She is alert.   Psychiatric:         Mood and Affect: Mood normal.

## 2025-06-06 ENCOUNTER — TELEPHONE (OUTPATIENT)
Dept: GYNECOLOGY | Facility: CLINIC | Age: 45
End: 2025-06-06

## 2025-06-06 NOTE — TELEPHONE ENCOUNTER
"Patient called the RX Refill Line. Message is being forwarded to the office.     Patient is requesting to go on birth control but she's unsure which one she should be on. Stated the last time it was prescribed, she never took it as she didn't need it but now she'd like to take something    She stated that it's been 6 months since she's smoked or vaped and she'd like a birth control \"to fit her new life style of not smoking\", stated she wants to be on something that won't make her gain weight as she already has from not smoking/vaping, wants one that \"doesn't make her feel like she's taking anything\"     She uses the [facility] on file     Please contact patient at     "

## 2025-06-11 NOTE — TELEPHONE ENCOUNTER
Patient returned phone call.    Relayed provider message.    Patient would like POP sent to her [facility] in [place].

## 2025-06-12 DIAGNOSIS — Z30.09 GENERAL COUNSELING AND ADVICE ON FEMALE CONTRACEPTION: ICD-10-CM

## 2025-06-12 RX ORDER — ACETAMINOPHEN AND CODEINE PHOSPHATE 120; 12 MG/5ML; MG/5ML
1 SOLUTION ORAL DAILY
Qty: 84 TABLET | Refills: 1 | Status: SHIPPED | OUTPATIENT
Start: 2025-06-12

## 2025-06-12 NOTE — TELEPHONE ENCOUNTER
(Per Dr. Gris Martínez- I sent a prescription to her pharmacy for progesterone only pills. This should be started with her next menstrual cycle. It can be either on the first day of her next cycle or the Sunday after her cycle starts. She should keep her appointment in October)     Left [facility] to call office.  MyChart not set up.